# Patient Record
Sex: FEMALE | Race: BLACK OR AFRICAN AMERICAN | NOT HISPANIC OR LATINO | Employment: FULL TIME | URBAN - METROPOLITAN AREA
[De-identification: names, ages, dates, MRNs, and addresses within clinical notes are randomized per-mention and may not be internally consistent; named-entity substitution may affect disease eponyms.]

---

## 2022-01-18 ENCOUNTER — HOSPITAL ENCOUNTER (EMERGENCY)
Facility: CLINIC | Age: 42
Discharge: HOME | End: 2022-01-18
Attending: EMERGENCY MEDICINE | Admitting: EMERGENCY MEDICINE
Payer: COMMERCIAL

## 2022-01-18 ENCOUNTER — APPOINTMENT (OUTPATIENT)
Dept: ULTRASOUND IMAGING | Facility: CLINIC | Age: 42
End: 2022-01-18
Attending: EMERGENCY MEDICINE
Payer: COMMERCIAL

## 2022-01-18 VITALS
TEMPERATURE: 97.2 F | DIASTOLIC BLOOD PRESSURE: 76 MMHG | SYSTOLIC BLOOD PRESSURE: 143 MMHG | OXYGEN SATURATION: 100 % | HEART RATE: 86 BPM | RESPIRATION RATE: 20 BRPM

## 2022-01-18 DIAGNOSIS — O20.0 THREATENED MISCARRIAGE IN EARLY PREGNANCY: ICD-10-CM

## 2022-01-18 PROBLEM — F41.9 ANXIETY: Status: ACTIVE | Noted: 2018-11-29

## 2022-01-18 LAB
ABO/RH(D): NORMAL
ANION GAP SERPL CALCULATED.3IONS-SCNC: 3 MMOL/L (ref 3–14)
ANTIBODY SCREEN: NEGATIVE
B-HCG SERPL-ACNC: 834 IU/L (ref 0–5)
BASOPHILS # BLD AUTO: 0.1 10E3/UL (ref 0–0.2)
BASOPHILS NFR BLD AUTO: 1 %
BUN SERPL-MCNC: 13 MG/DL (ref 7–30)
CALCIUM SERPL-MCNC: 8.8 MG/DL (ref 8.5–10.1)
CHLORIDE BLD-SCNC: 105 MMOL/L (ref 94–109)
CO2 SERPL-SCNC: 27 MMOL/L (ref 20–32)
CREAT SERPL-MCNC: 0.68 MG/DL (ref 0.52–1.04)
EOSINOPHIL # BLD AUTO: 0.2 10E3/UL (ref 0–0.7)
EOSINOPHIL NFR BLD AUTO: 2 %
ERYTHROCYTE [DISTWIDTH] IN BLOOD BY AUTOMATED COUNT: 16.7 % (ref 10–15)
GFR SERPL CREATININE-BSD FRML MDRD: >90 ML/MIN/1.73M2
GLUCOSE BLD-MCNC: 90 MG/DL (ref 70–99)
HCT VFR BLD AUTO: 32.2 % (ref 35–47)
HGB BLD-MCNC: 9.8 G/DL (ref 11.7–15.7)
HOLD SPECIMEN: NORMAL
IMM GRANULOCYTES # BLD: 0 10E3/UL
IMM GRANULOCYTES NFR BLD: 0 %
LYMPHOCYTES # BLD AUTO: 3.7 10E3/UL (ref 0.8–5.3)
LYMPHOCYTES NFR BLD AUTO: 42 %
MCH RBC QN AUTO: 25.3 PG (ref 26.5–33)
MCHC RBC AUTO-ENTMCNC: 30.4 G/DL (ref 31.5–36.5)
MCV RBC AUTO: 83 FL (ref 78–100)
MONOCYTES # BLD AUTO: 0.6 10E3/UL (ref 0–1.3)
MONOCYTES NFR BLD AUTO: 7 %
NEUTROPHILS # BLD AUTO: 4.2 10E3/UL (ref 1.6–8.3)
NEUTROPHILS NFR BLD AUTO: 48 %
NRBC # BLD AUTO: 0 10E3/UL
NRBC BLD AUTO-RTO: 0 /100
PLATELET # BLD AUTO: 326 10E3/UL (ref 150–450)
POTASSIUM BLD-SCNC: 3.7 MMOL/L (ref 3.4–5.3)
RBC # BLD AUTO: 3.88 10E6/UL (ref 3.8–5.2)
SODIUM SERPL-SCNC: 135 MMOL/L (ref 133–144)
SPECIMEN EXPIRATION DATE: NORMAL
WBC # BLD AUTO: 8.8 10E3/UL (ref 4–11)

## 2022-01-18 PROCEDURE — 86901 BLOOD TYPING SEROLOGIC RH(D): CPT | Performed by: EMERGENCY MEDICINE

## 2022-01-18 PROCEDURE — 85004 AUTOMATED DIFF WBC COUNT: CPT | Performed by: EMERGENCY MEDICINE

## 2022-01-18 PROCEDURE — 36415 COLL VENOUS BLD VENIPUNCTURE: CPT | Performed by: EMERGENCY MEDICINE

## 2022-01-18 PROCEDURE — 76801 OB US < 14 WKS SINGLE FETUS: CPT

## 2022-01-18 PROCEDURE — 84702 CHORIONIC GONADOTROPIN TEST: CPT | Performed by: EMERGENCY MEDICINE

## 2022-01-18 PROCEDURE — 99284 EMERGENCY DEPT VISIT MOD MDM: CPT | Mod: 25

## 2022-01-18 PROCEDURE — 80048 BASIC METABOLIC PNL TOTAL CA: CPT | Performed by: EMERGENCY MEDICINE

## 2022-01-18 RX ORDER — PRENATAL VIT/IRON FUM/FOLIC AC 27MG-0.8MG
1 TABLET ORAL DAILY
Qty: 90 TABLET | Refills: 0 | Status: SHIPPED | OUTPATIENT
Start: 2022-01-18

## 2022-01-18 ASSESSMENT — ENCOUNTER SYMPTOMS
LIGHT-HEADEDNESS: 1
FEVER: 0
DYSURIA: 0
CONSTIPATION: 0
NAUSEA: 1
VOMITING: 0
ARTHRALGIAS: 1
DIARRHEA: 0
HEMATURIA: 0
CHILLS: 1
SHORTNESS OF BREATH: 0
MYALGIAS: 1

## 2022-01-18 NOTE — ED TRIAGE NOTES
Presents to ED with vaginal bleeding. Last menstrual period November 3rd. Pt states she started spotting in December, then it went away. A few days ago, she had dark red blood with clots and states sometimes there is clots. Pt states she feels lightheaded. Unable to get in to see OB for awhile. ABCs intact. A&OX4.

## 2022-01-18 NOTE — ED PROVIDER NOTES
History   Chief Complaint:  Vaginal Bleeding     The history is provided by the patient.      Marium Rivero is a 41 year old female  who presents with vaginal bleeding. Patient developed vaginal bleeding eight days ago. The bleeding was initially heavy and had clots and she reports passing what looked like some tissue. It got lighter for a few days but today she noticed increased bleeding again prompting her visit. She did have some mild pelvic pain. She is nauseous and having body aches. She feels lightheaded and chilled. She is feeling more emotional. Her last menstrual period was the . She had a positive pregnancy test at home. She thinks she had a miscarriage. She had some leg swelling but that has resolved. No shortness of breath. No urinary or bowel problems. No vomiting. No fever. No previous miscarriages. She denies any other symptoms.    Review of Systems   Constitutional: Positive for chills. Negative for fever.   Respiratory: Negative for shortness of breath.    Cardiovascular: Negative for leg swelling.   Gastrointestinal: Positive for nausea. Negative for constipation, diarrhea and vomiting.   Genitourinary: Positive for pelvic pain, vaginal bleeding and vaginal pain. Negative for dysuria and hematuria.   Musculoskeletal: Positive for arthralgias and myalgias.   Neurological: Positive for light-headedness.   All other systems reviewed and are negative.      Allergies:  The patient has no known allergies.     Medications:  Flexeril   Immune globulin injection   Mefloquine   Buspar     Past Medical History:     Pityriasis alba   Female genital infibulation     Anxiety   Preeclampsia   Anemia, post partum   Major depression   Grand multipara   Gastroenteritis     Social History:  Presents to ED alone    Physical Exam     Patient Vitals for the past 24 hrs:   BP Temp Temp src Pulse Resp SpO2   22 1902 (!) 143/76 -- -- 86 20 100 %   22 1442 (!) 148/79 97.2  F (36.2  C)  Temporal 84 18 100 %       Physical Exam  General:          Well appearing, nontoxic. Resting comfortably  Head:              Scalp, face, and head appear normal  Eyes:               Pupils are equal, round                          Conjunctivae non-injected and sclerae white  ENT:                The external nose is normal                          Pinnae are normal  Neck:              Normal range of motion                          There is no rigidity noted                          Trachea is in the midline  CV:                  Regular rate and rhythm                           Normal S1/S2, no S3/S4                          No murmur or rub. Radial pulses 2+ bilaterally.  Resp:              Lungs are clear and equal bilaterally  There is no tachypnea                          No increased work of breathing                          No rales, wheezing, or rhonchi  GI:                   Abdomen is soft, no rigidity or guarding                          No distension, or mass                          No tenderness or rebound tenderness   MS:                  Normal muscular tone                          Symmetric motor strength                          No lower extremity edema  Skin:               No rash or acute skin lesions noted  Neuro:  Awake and alert  Speech is normal and fluent  Moves all extremities spontaneously  Psych:              Normal affect. Appropriate interactions.    Emergency Department Course     Imaging:  OB  US 1st trimester w transvag   Final Result   IMPRESSION: Small cystic structure at the endometrium could be a   gestational sac, but no yolk sac or fetal pole can be seen. Viability   is not established. If this is a gestational sac, the gestational age   is approximately 4 weeks 6 days. Recommend further assessment with   serial beta hCG, and imaging as needed. The right and left ovaries are   obscured for assessment.      LEIDY MELGAR MD            SYSTEM ID:  [id number]      Report per  radiology    Laboratory:  Labs Ordered and Resulted from Time of ED Arrival to Time of ED Departure   HCG QUANTITATIVE PREGNANCY - Abnormal       Result Value    hCG Quantitative 834 (*)    CBC WITH PLATELETS AND DIFFERENTIAL - Abnormal    WBC Count 8.8      RBC Count 3.88      Hemoglobin 9.8 (*)     Hematocrit 32.2 (*)     MCV 83      MCH 25.3 (*)     MCHC 30.4 (*)     RDW 16.7 (*)     Platelet Count 326      % Neutrophils 48      % Lymphocytes 42      % Monocytes 7      % Eosinophils 2      % Basophils 1      % Immature Granulocytes 0      NRBCs per 100 WBC 0      Absolute Neutrophils 4.2      Absolute Lymphocytes 3.7      Absolute Monocytes 0.6      Absolute Eosinophils 0.2      Absolute Basophils 0.1      Absolute Immature Granulocytes 0.0      Absolute NRBCs 0.0     BASIC METABOLIC PANEL - Normal    Sodium 135      Potassium 3.7      Chloride 105      Carbon Dioxide (CO2) 27      Anion Gap 3      Urea Nitrogen 13      Creatinine 0.68      Calcium 8.8      Glucose 90      GFR Estimate >90     TYPE AND SCREEN, ADULT    ABO/RH(D) B POS      Antibody Screen Negative      SPECIMEN EXPIRATION DATE      ABO/RH TYPE AND SCREEN      Emergency Department Course:    Reviewed:  I reviewed nursing notes, vitals, past medical history and Care Everywhere    Assessments/Consults:  ED Course as of 22e  Obtained history and examined the patient as noted above.      Rechecked the patient and explained findings.       Disposition:  The patient was discharged to home.     Impression & Plan       Medical Decision Making:  Marium Rivero is a 41 year old female  who presents for vaginal bleeding in the setting of positive home pregnancy test.  On my evaluation she is well-appearing, hemodynamically stable and afebrile.  Patient reports large volume of bleeding with passing clots and tissue.  She reports that she thinks that she has had a miscarriage.  Abdominal exam is benign  without evidence of peritonitis or acute surgical emergency.  No significant abdominal pain or tenderness on examination to suggest ectopic pregnancy.  She reports her last menstrual period was at the beginning of November which would place her at approximately 10 weeks gestational age currently.  Pelvic ultrasound was obtained which reveals a small cystic structure at the endometrium which may represent a small gestational sac but no definitive IUP is visualized.  Measurements of this sac/cyst would correspond to a gestational age of 4 weeks and 6 days if it did represent a true gestational sac. Quant hCG is 834.  I discussed with the patient that findings may represent earlier pregnancy than expected given her last menstrual period or miscarriage in progress.  Patient will need follow-up with OB/GYN and repeat hCG level in 2 to 3 days.  I recommended expectant management.  She should start prenatal vitamin with iron given blood loss anemia.  No severe hemorrhage which would require emergent OB/GYN consultation from the emergency department or admission to the hospital.  ED work-up is otherwise reassuring.  Patient was agreeable to plan of care.  Close return precautions were provided and she was discharged in stable condition.    Diagnosis:    ICD-10-CM    1. Threatened miscarriage in early pregnancy  O20.0        Scribe Disclosure:  Nghia HERRERA, am serving as a scribe at 4:50 PM on 1/18/2022 to document services personally performed by Bruno Russell MD based on my observations and the provider's statements to me.            Bruno Russell MD  01/18/22 2045

## 2022-01-19 NOTE — DISCHARGE INSTRUCTIONS
YOUR PREGNANCY HORMONE LEVEL HCG NEEDS TO BE RECHECKED IN 2-3 DAYS TO DETERMINE IF THE PREGNANCY HAS FAILED AND YOU ARE HAVING A MISCARRIAGE.

## 2022-10-04 ENCOUNTER — HOSPITAL ENCOUNTER (EMERGENCY)
Facility: CLINIC | Age: 42
Discharge: HOME | End: 2022-10-04
Attending: EMERGENCY MEDICINE | Admitting: EMERGENCY MEDICINE
Payer: COMMERCIAL

## 2022-10-04 VITALS
TEMPERATURE: 97 F | OXYGEN SATURATION: 99 % | SYSTOLIC BLOOD PRESSURE: 112 MMHG | RESPIRATION RATE: 16 BRPM | HEART RATE: 82 BPM | DIASTOLIC BLOOD PRESSURE: 61 MMHG

## 2022-10-04 DIAGNOSIS — R10.9 FLANK PAIN: ICD-10-CM

## 2022-10-04 DIAGNOSIS — S39.012A STRAIN OF LUMBAR REGION, INITIAL ENCOUNTER: ICD-10-CM

## 2022-10-04 LAB
ALBUMIN SERPL BCG-MCNC: 3.3 G/DL (ref 3.5–5.2)
ALBUMIN UR-MCNC: NEGATIVE MG/DL
ALP SERPL-CCNC: 81 U/L (ref 35–104)
ALT SERPL W P-5'-P-CCNC: 7 U/L (ref 10–35)
ANION GAP SERPL CALCULATED.3IONS-SCNC: 13 MMOL/L (ref 7–15)
APPEARANCE UR: CLEAR
AST SERPL W P-5'-P-CCNC: 16 U/L (ref 10–35)
BACTERIA #/AREA URNS HPF: ABNORMAL /HPF
BASOPHILS # BLD AUTO: 0 10E3/UL (ref 0–0.2)
BASOPHILS NFR BLD AUTO: 0 %
BILIRUB SERPL-MCNC: <0.2 MG/DL
BILIRUB UR QL STRIP: NEGATIVE
BUN SERPL-MCNC: 4.5 MG/DL (ref 6–20)
CALCIUM SERPL-MCNC: 8.9 MG/DL (ref 8.6–10)
CHLORIDE SERPL-SCNC: 102 MMOL/L (ref 98–107)
COLOR UR AUTO: ABNORMAL
CREAT SERPL-MCNC: 0.48 MG/DL (ref 0.51–0.95)
DEPRECATED HCO3 PLAS-SCNC: 20 MMOL/L (ref 22–29)
EOSINOPHIL # BLD AUTO: 0.1 10E3/UL (ref 0–0.7)
EOSINOPHIL NFR BLD AUTO: 1 %
ERYTHROCYTE [DISTWIDTH] IN BLOOD BY AUTOMATED COUNT: 14.1 % (ref 10–15)
GFR SERPL CREATININE-BSD FRML MDRD: >90 ML/MIN/1.73M2
GLUCOSE SERPL-MCNC: 118 MG/DL (ref 70–99)
GLUCOSE UR STRIP-MCNC: NEGATIVE MG/DL
HCT VFR BLD AUTO: 34 % (ref 35–47)
HGB BLD-MCNC: 10.8 G/DL (ref 11.7–15.7)
HGB UR QL STRIP: NEGATIVE
HOLD SPECIMEN: NORMAL
IMM GRANULOCYTES # BLD: 0.1 10E3/UL
IMM GRANULOCYTES NFR BLD: 1 %
KETONES UR STRIP-MCNC: NEGATIVE MG/DL
LEUKOCYTE ESTERASE UR QL STRIP: NEGATIVE
LIPASE SERPL-CCNC: 24 U/L (ref 13–60)
LYMPHOCYTES # BLD AUTO: 1.9 10E3/UL (ref 0.8–5.3)
LYMPHOCYTES NFR BLD AUTO: 22 %
MCH RBC QN AUTO: 27.8 PG (ref 26.5–33)
MCHC RBC AUTO-ENTMCNC: 31.8 G/DL (ref 31.5–36.5)
MCV RBC AUTO: 87 FL (ref 78–100)
MONOCYTES # BLD AUTO: 0.4 10E3/UL (ref 0–1.3)
MONOCYTES NFR BLD AUTO: 4 %
MUCOUS THREADS #/AREA URNS LPF: PRESENT /LPF
NEUTROPHILS # BLD AUTO: 6.4 10E3/UL (ref 1.6–8.3)
NEUTROPHILS NFR BLD AUTO: 72 %
NITRATE UR QL: NEGATIVE
NRBC # BLD AUTO: 0 10E3/UL
NRBC BLD AUTO-RTO: 0 /100
PH UR STRIP: 6.5 [PH] (ref 5–7)
PLATELET # BLD AUTO: 187 10E3/UL (ref 150–450)
POTASSIUM SERPL-SCNC: 3.5 MMOL/L (ref 3.4–5.3)
PROT SERPL-MCNC: 6.8 G/DL (ref 6.4–8.3)
RBC # BLD AUTO: 3.89 10E6/UL (ref 3.8–5.2)
RBC URINE: <1 /HPF
SODIUM SERPL-SCNC: 135 MMOL/L (ref 136–145)
SP GR UR STRIP: 1.01 (ref 1–1.03)
SQUAMOUS EPITHELIAL: 6 /HPF
UROBILINOGEN UR STRIP-MCNC: NORMAL MG/DL
WBC # BLD AUTO: 8.9 10E3/UL (ref 4–11)
WBC URINE: 3 /HPF

## 2022-10-04 PROCEDURE — 81001 URINALYSIS AUTO W/SCOPE: CPT | Performed by: EMERGENCY MEDICINE

## 2022-10-04 PROCEDURE — 250N000013 HC RX MED GY IP 250 OP 250 PS 637: Performed by: EMERGENCY MEDICINE

## 2022-10-04 PROCEDURE — 36415 COLL VENOUS BLD VENIPUNCTURE: CPT | Performed by: EMERGENCY MEDICINE

## 2022-10-04 PROCEDURE — 85025 COMPLETE CBC W/AUTO DIFF WBC: CPT | Performed by: EMERGENCY MEDICINE

## 2022-10-04 PROCEDURE — 83690 ASSAY OF LIPASE: CPT | Performed by: EMERGENCY MEDICINE

## 2022-10-04 PROCEDURE — 99283 EMERGENCY DEPT VISIT LOW MDM: CPT

## 2022-10-04 PROCEDURE — 80053 COMPREHEN METABOLIC PANEL: CPT | Performed by: EMERGENCY MEDICINE

## 2022-10-04 RX ORDER — ACETAMINOPHEN 325 MG/1
975 TABLET ORAL ONCE
Status: COMPLETED | OUTPATIENT
Start: 2022-10-04 | End: 2022-10-04

## 2022-10-04 RX ADMIN — ACETAMINOPHEN 975 MG: 325 TABLET, FILM COATED ORAL at 10:22

## 2022-10-04 ASSESSMENT — ENCOUNTER SYMPTOMS
DIARRHEA: 0
MYALGIAS: 1
NAUSEA: 0
DYSURIA: 0
CHILLS: 0
VOMITING: 0
SHORTNESS OF BREATH: 0
COUGH: 0
WEAKNESS: 0
HEMATURIA: 0
ABDOMINAL PAIN: 0
NUMBNESS: 0
DIFFICULTY URINATING: 0
FEVER: 0
BACK PAIN: 1
CONSTIPATION: 0
RHINORRHEA: 0
FREQUENCY: 0

## 2022-10-04 ASSESSMENT — ACTIVITIES OF DAILY LIVING (ADL): ADLS_ACUITY_SCORE: 35

## 2022-10-04 NOTE — ED PROVIDER NOTES
History   Chief Complaint:  Back Pain       HPI   Marium Rivero is a 42 year old female  currently 26 weeks pregnant who presents with back pain that began last night. Patient says the most severe pain is in her lower back, worsened on the right side. The pain radiates up into her shoulders and down both of her legs as well, making it difficult for her to ambulate and move. She has felt this pain before, but says it became much more severe last night after she got up from a low couch too quickly around 1900. She thinks she may have pulled a muscle in her back at this time. Patient took 500 mg of Tylenol last night, which offered some relief. She denies abdominal pain, vaginal bleeding, vaginal discharge, loss of fluid, and any contractions. Patient says she has a history of preeclampsia. She follows with [facility] for this pregnancy.  No LOF or CTX.    Review of Systems   Constitutional: Negative for chills and fever.   HENT: Negative for congestion and rhinorrhea.    Eyes: Negative for visual disturbance.   Respiratory: Negative for cough and shortness of breath.    Cardiovascular: Negative for chest pain.   Gastrointestinal: Negative for abdominal pain, constipation, diarrhea, nausea and vomiting.   Genitourinary: Negative for decreased urine volume, difficulty urinating, dysuria, frequency, hematuria, urgency, vaginal bleeding and vaginal discharge.   Musculoskeletal: Positive for back pain and myalgias.   Neurological: Negative for weakness and numbness.   All other systems reviewed and are negative.    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 No distress.  Head: Atraumatic.  Eyes: Conjunctivae and EOM are normal. No scleral icterus.  Neck: Normal range of motion. Neck supple.   Cardiovascular: Normal rate, regular rhythm, normal heart sounds and intact distal perfusion.   Pulmonary/Chest: Breath sounds normal. No respiratory distress.  Abdominal: Soft. Bowel sounds are normal. No distension. No tenderness. No rebound or guarding.   Musculoskeletal: Normal range of motion. No edema or tenderness. No midline cervical tenderness. Patient localizes the pain to the high right paralumbar muscles. Strength and sensation 5/5 in extremities   Neurological: Alert and orientated to person, place, and time. No observable focal neuro deficit  Skin: Warm and dry. No rash noted. Not diaphoretic.     Emergency Department Course   Laboratory:  Labs Ordered and Resulted from Time of ED Arrival to Time of ED Departure   COMPREHENSIVE METABOLIC PANEL - Abnormal       Result Value    Sodium 135 (*)     Potassium 3.5      Chloride 102      Carbon Dioxide (CO2) 20 (*)     Anion Gap 13      Urea Nitrogen 4.5 (*)     Creatinine 0.48 (*)     Calcium 8.9      Glucose 118 (*)     Alkaline Phosphatase 81      AST 16      ALT 7 (*)     Protein Total 6.8      Albumin 3.3 (*)     Bilirubin Total <0.2      GFR Estimate >90     ROUTINE UA WITH MICROSCOPIC REFLEX TO CULTURE - Abnormal    Color Urine Light Yellow      Appearance Urine Clear      Glucose Urine Negative      Bilirubin Urine Negative      Ketones Urine Negative      Specific Gravity Urine 1.009      Blood Urine Negative      pH Urine 6.5      Protein Albumin Urine Negative      Urobilinogen Urine Normal      Nitrite Urine Negative      Leukocyte Esterase Urine Negative      Bacteria Urine Few (*)     Mucus Urine  Present (*)     RBC Urine <1      WBC Urine 3      Squamous Epithelials Urine 6 (*)    CBC WITH PLATELETS AND DIFFERENTIAL - Abnormal    WBC Count 8.9      RBC Count 3.89      Hemoglobin 10.8 (*)     Hematocrit 34.0 (*)     MCV 87      MCH 27.8      MCHC 31.8      RDW 14.1      Platelet Count 187      % Neutrophils 72      % Lymphocytes 22      % Monocytes 4      % Eosinophils 1      % Basophils 0      % Immature Granulocytes 1      NRBCs per 100 WBC 0      Absolute Neutrophils 6.4      Absolute Lymphocytes 1.9      Absolute Monocytes 0.4      Absolute Eosinophils 0.1      Absolute Basophils 0.0      Absolute Immature Granulocytes 0.1      Absolute NRBCs 0.0     LIPASE - Normal    Lipase 24        Emergency Department Course:   Reviewed:  I reviewed nursing notes, vitals, past medical history and Care Everywhere    Assessments:  1006 I obtained history and examined the patient as noted above.    Interventions:  1022 Tylenol  975 mg  PO    Disposition:  The patient was discharged to home.     Impression & Plan     CMS Diagnoses: None    Medical Decision Making:  Marium Rivero is a 42 year old female who presents for evaluation of back pain that started after quickly jumping up from couch and feeling a muscle or back strain.   Pain has improved with interventions in the emergency department. The patient did not sustain any trauma and pregnant, therefore x-rays are not necessary due to the low likelihood of fracture or subluxation.  No red flag symptoms to suggest CT and/or MRI is indicated at this point.  There is no clinical evidence of cauda equina syndrome, discitis, spinal/epidural space hematoma or epidural abscess or other worrisome etiology. The neurological exam is normal and the patient's symptoms seem consistent with musculoskeletal issues and significant muscle spasm.  Broad DDX and referred pain and mimics also considered and workup above reflects this.    The patient will be discharged with pain  medications to use as directed. Ice or heat to the back and stretching exercises. No heavy lifting, bending or twisting. Return if increasing pain, numbness, weakness, or bowel or bladder dysfunction. She was advised to schedule follow-up with her primary doctor within 3 days to re-assess symptoms.  All questions and concerns were answered. The patient was discharged home and recommended to follow up with her primary physician and return with any new or worsening symptoms.     Diagnosis:    ICD-10-CM    1. Strain of lumbar region, initial encounter  S39.012A    2. Flank pain  R10.9        Scribe Disclosure:  I, Rita De Souza, am serving as a scribe at 10:05 AM on 10/4/2022 to document services personally performed by Anderson Pappas MD based on my observations and the provider's statements to me.            Anderson Pappas MD  10/04/22 5613

## 2022-10-04 NOTE — ED TRIAGE NOTES
Pt states that she developed atraumatic back pain last night. Pain radiates up into her shoulders and down into both legs making it difficult to walk. Pain started after standing up rom a low couch. Pt is 26 wks pregnant. This is her 9th pregnancy. Pt does not believe this pain is related to her pregnancy. Pt states that she believes her pain is in her muscles.

## 2022-11-08 ENCOUNTER — HOSPITAL ENCOUNTER (EMERGENCY)
Facility: CLINIC | Age: 42
Discharge: HOME | End: 2022-11-08
Attending: EMERGENCY MEDICINE | Admitting: EMERGENCY MEDICINE
Payer: COMMERCIAL

## 2022-11-08 ENCOUNTER — APPOINTMENT (OUTPATIENT)
Dept: GENERAL RADIOLOGY | Facility: CLINIC | Age: 42
End: 2022-11-08
Attending: EMERGENCY MEDICINE
Payer: COMMERCIAL

## 2022-11-08 VITALS
DIASTOLIC BLOOD PRESSURE: 89 MMHG | HEART RATE: 84 BPM | OXYGEN SATURATION: 100 % | SYSTOLIC BLOOD PRESSURE: 146 MMHG | TEMPERATURE: 96.9 F | RESPIRATION RATE: 18 BRPM

## 2022-11-08 DIAGNOSIS — K11.21 PAROTITIS, ACUTE: ICD-10-CM

## 2022-11-08 DIAGNOSIS — J02.0 STREPTOCOCCAL PHARYNGITIS: ICD-10-CM

## 2022-11-08 LAB
ANION GAP SERPL CALCULATED.3IONS-SCNC: 12 MMOL/L (ref 7–15)
BASOPHILS # BLD AUTO: 0 10E3/UL (ref 0–0.2)
BASOPHILS NFR BLD AUTO: 0 %
BUN SERPL-MCNC: 7.6 MG/DL (ref 6–20)
CALCIUM SERPL-MCNC: 8.8 MG/DL (ref 8.6–10)
CHLORIDE SERPL-SCNC: 102 MMOL/L (ref 98–107)
CREAT SERPL-MCNC: 0.47 MG/DL (ref 0.51–0.95)
DEPRECATED HCO3 PLAS-SCNC: 23 MMOL/L (ref 22–29)
DEPRECATED S PYO AG THROAT QL EIA: POSITIVE
EOSINOPHIL # BLD AUTO: 0 10E3/UL (ref 0–0.7)
EOSINOPHIL NFR BLD AUTO: 0 %
ERYTHROCYTE [DISTWIDTH] IN BLOOD BY AUTOMATED COUNT: 13.2 % (ref 10–15)
GFR SERPL CREATININE-BSD FRML MDRD: >90 ML/MIN/1.73M2
GLUCOSE SERPL-MCNC: 101 MG/DL (ref 70–99)
HCT VFR BLD AUTO: 36 % (ref 35–47)
HGB BLD-MCNC: 11.1 G/DL (ref 11.7–15.7)
IMM GRANULOCYTES # BLD: 0 10E3/UL
IMM GRANULOCYTES NFR BLD: 0 %
LYMPHOCYTES # BLD AUTO: 2.6 10E3/UL (ref 0.8–5.3)
LYMPHOCYTES NFR BLD AUTO: 29 %
MCH RBC QN AUTO: 27 PG (ref 26.5–33)
MCHC RBC AUTO-ENTMCNC: 30.8 G/DL (ref 31.5–36.5)
MCV RBC AUTO: 88 FL (ref 78–100)
MONOCYTES # BLD AUTO: 0.4 10E3/UL (ref 0–1.3)
MONOCYTES NFR BLD AUTO: 4 %
NEUTROPHILS # BLD AUTO: 5.9 10E3/UL (ref 1.6–8.3)
NEUTROPHILS NFR BLD AUTO: 67 %
NRBC # BLD AUTO: 0 10E3/UL
NRBC BLD AUTO-RTO: 0 /100
PLATELET # BLD AUTO: 207 10E3/UL (ref 150–450)
POTASSIUM SERPL-SCNC: 4.2 MMOL/L (ref 3.4–5.3)
RBC # BLD AUTO: 4.11 10E6/UL (ref 3.8–5.2)
SODIUM SERPL-SCNC: 137 MMOL/L (ref 136–145)
WBC # BLD AUTO: 8.9 10E3/UL (ref 4–11)

## 2022-11-08 PROCEDURE — 85025 COMPLETE CBC W/AUTO DIFF WBC: CPT | Performed by: EMERGENCY MEDICINE

## 2022-11-08 PROCEDURE — 82310 ASSAY OF CALCIUM: CPT | Performed by: EMERGENCY MEDICINE

## 2022-11-08 PROCEDURE — 36415 COLL VENOUS BLD VENIPUNCTURE: CPT | Performed by: EMERGENCY MEDICINE

## 2022-11-08 PROCEDURE — 87880 STREP A ASSAY W/OPTIC: CPT | Performed by: EMERGENCY MEDICINE

## 2022-11-08 PROCEDURE — 99284 EMERGENCY DEPT VISIT MOD MDM: CPT

## 2022-11-08 PROCEDURE — 70360 X-RAY EXAM OF NECK: CPT

## 2022-11-08 RX ORDER — PENICILLIN V POTASSIUM 500 MG/1
500 TABLET, FILM COATED ORAL 4 TIMES DAILY
Qty: 40 TABLET | Refills: 0 | Status: SHIPPED | OUTPATIENT
Start: 2022-11-08 | End: 2022-11-18

## 2022-11-08 ASSESSMENT — ACTIVITIES OF DAILY LIVING (ADL): ADLS_ACUITY_SCORE: 35

## 2022-11-08 ASSESSMENT — ENCOUNTER SYMPTOMS
FEVER: 1
COUGH: 0

## 2022-11-08 NOTE — ED TRIAGE NOTES
Pt c/o pain radiating from neck up to left ear x1 week. Endorses trouble swallowing but no throat pain. Pt just took 1g Tylenol PTA. No fevers. No facial droop. BEFAST negative. Pt is currently 30 weeks pregnant. ABCs intact.

## 2022-11-08 NOTE — DISCHARGE INSTRUCTIONS
Return to the ER immediately for increased swelling or difficulty breathing.  Return for difficulty swallowing.    Please call the ENT clinic to schedule a follow-up appointment by the end of the week.    Your evaluation is consistent with parotitis (inflammation of the salivary gland).  Drink plenty of fluids to stay hydrated.  As we discussed you should try sucking on sugar-free lemon drops or other sour candy.

## 2022-11-08 NOTE — ED PROVIDER NOTES
"  History   Chief Complaint:  Mouth Problem and Facial Pain     The history is provided by the patient.      Marium Rivero is a 42 year old female, currently 30 weeks pregnant, who presents with left-sided facial and jaw pain along with pain and swelling under her tongue, worsening since onset 1 week ago. The patient states that \"it feels like there's another tongue under there [her tongue]\" and endorses increased salivary production. She additionally endorses fever, and persisting symptoms without relief prompted concern and her presentation to ED at this time. She denies experiencing similar symptoms in the past. She is otherwise well and without cough or other symptoms. She is currently pregnant and denies vaginal pain, bleeding, or discharge. The patient is otherwise healthy and without chronic medical conditions. She has had no recent injury.    Review of Systems   Constitutional: Positive for fever.   HENT:        (+) oral swelling under tongue  (+) left jaw/neck pain   Respiratory: Negative for cough.    Genitourinary: Negative for vaginal bleeding, vaginal discharge and vaginal pain.   All other systems reviewed and are negative.    Allergies:  No known drug allergies    Medications:  Cyclobenzaprine  Mefloquine    Past Medical History:     Anemia  Anxiety  Vitamin D deficiency  Preeclampsia  Recurrent UTI  Grand multiparity with pregnancy  Normal vaginal delivery  Major depression  Female genital cutting type 1 status    Social History:  The patient presents to the ED alone.  The patient arrived in a private vehicle.  PCP: No Ref-Primary, Physician     Physical Exam     Patient Vitals for the past 24 hrs:   BP Temp Temp src Pulse Resp SpO2   11/08/22 0737 (!) 146/89 96.9  F (36.1  C) Temporal 84 18 100 %     Physical Exam  Constitutional:       General: She is not in acute distress.     Appearance: She is not diaphoretic.   HENT:      Head: Atraumatic.      Right Ear: Tympanic membrane, ear canal and " external ear normal.      Left Ear: Tympanic membrane, ear canal and external ear normal.      Mouth/Throat:      Mouth: Mucous membranes are moist.      Pharynx: Oropharyngeal exudate present.      Comments: Mild erythema the posterior oropharynx.  Uvula midline.  No evidence of peritonsillar or retropharyngeal abscess.  The oropharynx is widely patent.  No drooling or stridor.  No tenderness of the anterior neck.  No elevation of the floor the mouth.  No submandibular induration.  There is mild fullness and tenderness over the left parotid gland along the angle of the mandible.  The right tonsil has a small amount of adherent exudate.  Eyes:      General: No scleral icterus.     Pupils: Pupils are equal, round, and reactive to light.   Cardiovascular:      Heart sounds: Normal heart sounds.   Pulmonary:      Effort: No respiratory distress.      Breath sounds: Normal breath sounds.   Abdominal:      General: Bowel sounds are normal.      Palpations: Abdomen is soft.      Tenderness: There is no abdominal tenderness.   Musculoskeletal:         General: No tenderness.   Skin:     General: Skin is warm.      Findings: No rash.           Emergency Department Course     Imaging:  XR Neck Soft Tissue   Preliminary Result   IMPRESSION: Single lateral view of the neck. The epiglottic shadow   appears to be closely opposed to the base of tongue, limiting its   evaluation. No obvious epiglottic soft tissue thickening identified.   The radiographic appearance of the base of tongue and   prevertebral/retropharyngeal soft tissues appear normal. Visualized   upper tracheal air column appears unremarkable. Normal cervical   vertebral body heights. Straightening of the normal cervical lordosis   which may be positional. There may be mild disc space narrowing at   C7-T1. No advanced cervical facet arthropathy is seen.        Report per radiology    Laboratory:  Labs Ordered and Resulted from Time of ED Arrival to Time of ED  Departure   BASIC METABOLIC PANEL - Abnormal       Result Value    Sodium 137      Potassium 4.2      Chloride 102      Carbon Dioxide (CO2) 23      Anion Gap 12      Urea Nitrogen 7.6      Creatinine 0.47 (*)     Calcium 8.8      Glucose 101 (*)     GFR Estimate >90     CBC WITH PLATELETS AND DIFFERENTIAL - Abnormal    WBC Count 8.9      RBC Count 4.11      Hemoglobin 11.1 (*)     Hematocrit 36.0      MCV 88      MCH 27.0      MCHC 30.8 (*)     RDW 13.2      Platelet Count 207      % Neutrophils 67      % Lymphocytes 29      % Monocytes 4      % Eosinophils 0      % Basophils 0      % Immature Granulocytes 0      NRBCs per 100 WBC 0      Absolute Neutrophils 5.9      Absolute Lymphocytes 2.6      Absolute Monocytes 0.4      Absolute Eosinophils 0.0      Absolute Basophils 0.0      Absolute Immature Granulocytes 0.0      Absolute NRBCs 0.0     STREPTOCOCCUS A RAPID SCREEN W REFELX TO PCR - Abnormal    Group A Strep antigen Positive (*)       Emergency Department Course:      Reviewed:  I reviewed nursing notes, vitals, past medical history and Care Everywhere.    Assessments:  1122 I obtained history and examined the patient as noted above.   1246 I rechecked the patient and explained findings. I believe that they are safe for discharge at this time.    Disposition:  The patient was discharged to home.     Impression & Plan     Medical Decision Making:  This patient is a 42-year-old woman who presents to the emergency department for evaluation of difficulty swallowing and hypersalivation.  This could be hypersalivation associated with pregnancy.  However, given the tenderness and apparent swelling over the left parotid gland this is most likely parotitis.  X-rays not consistent with epiglottitis or other acute pathology.  Her exam is not consistent with epiglottitis.  She is strep positive so will be covered with penicillin.  She is being referred to ENT.  We discussed return precautions.  She was advised to try a  sugar-free sour candies such as lemon drops.  On exam she does not have any salivary gland stone or other obvious obstruction.    Diagnosis:    ICD-10-CM    1. Parotitis, acute  K11.21       2. Streptococcal pharyngitis  J02.0         Discharge Medications:  New Prescriptions    PENICILLIN V (VEETID) 500 MG TABLET    Take 1 tablet (500 mg) by mouth 4 times daily for 10 days     Scribe Disclosure:  IYamileth, am serving as a scribe at 11:21 AM on 11/8/2022 to document services personally performed by Ata Larose MD based on my observations and the provider's statements to me.     Ata Larose MD  11/08/22 9928

## 2023-12-02 ENCOUNTER — APPOINTMENT (OUTPATIENT)
Dept: CT IMAGING | Facility: CLINIC | Age: 43
End: 2023-12-02
Attending: EMERGENCY MEDICINE
Payer: COMMERCIAL

## 2023-12-02 ENCOUNTER — HOSPITAL ENCOUNTER (EMERGENCY)
Facility: CLINIC | Age: 43
Discharge: HOME | End: 2023-12-02
Attending: EMERGENCY MEDICINE | Admitting: EMERGENCY MEDICINE
Payer: COMMERCIAL

## 2023-12-02 VITALS
TEMPERATURE: 97.5 F | DIASTOLIC BLOOD PRESSURE: 79 MMHG | SYSTOLIC BLOOD PRESSURE: 109 MMHG | OXYGEN SATURATION: 100 % | HEART RATE: 66 BPM | RESPIRATION RATE: 16 BRPM

## 2023-12-02 DIAGNOSIS — N30.01 ACUTE CYSTITIS WITH HEMATURIA: ICD-10-CM

## 2023-12-02 LAB
ALBUMIN UR-MCNC: 30 MG/DL
ANION GAP SERPL CALCULATED.3IONS-SCNC: 10 MMOL/L (ref 7–15)
APPEARANCE UR: ABNORMAL
BACTERIA #/AREA URNS HPF: ABNORMAL /HPF
BASOPHILS # BLD AUTO: 0 10E3/UL (ref 0–0.2)
BASOPHILS NFR BLD AUTO: 0 %
BILIRUB UR QL STRIP: NEGATIVE
BUN SERPL-MCNC: 9.5 MG/DL (ref 6–20)
CALCIUM SERPL-MCNC: 8.9 MG/DL (ref 8.6–10)
CHLORIDE SERPL-SCNC: 102 MMOL/L (ref 98–107)
COLOR UR AUTO: YELLOW
CREAT SERPL-MCNC: 0.61 MG/DL (ref 0.51–0.95)
DEPRECATED HCO3 PLAS-SCNC: 25 MMOL/L (ref 22–29)
EGFRCR SERPLBLD CKD-EPI 2021: >90 ML/MIN/1.73M2
EOSINOPHIL # BLD AUTO: 0.1 10E3/UL (ref 0–0.7)
EOSINOPHIL NFR BLD AUTO: 2 %
ERYTHROCYTE [DISTWIDTH] IN BLOOD BY AUTOMATED COUNT: 13.9 % (ref 10–15)
GLUCOSE SERPL-MCNC: 105 MG/DL (ref 70–99)
GLUCOSE UR STRIP-MCNC: NEGATIVE MG/DL
HCG UR QL: NEGATIVE
HCT VFR BLD AUTO: 37.6 % (ref 35–47)
HGB BLD-MCNC: 12 G/DL (ref 11.7–15.7)
HGB UR QL STRIP: ABNORMAL
IMM GRANULOCYTES # BLD: 0 10E3/UL
IMM GRANULOCYTES NFR BLD: 0 %
KETONES UR STRIP-MCNC: NEGATIVE MG/DL
LEUKOCYTE ESTERASE UR QL STRIP: ABNORMAL
LYMPHOCYTES # BLD AUTO: 3.1 10E3/UL (ref 0.8–5.3)
LYMPHOCYTES NFR BLD AUTO: 39 %
MCH RBC QN AUTO: 28.2 PG (ref 26.5–33)
MCHC RBC AUTO-ENTMCNC: 31.9 G/DL (ref 31.5–36.5)
MCV RBC AUTO: 88 FL (ref 78–100)
MONOCYTES # BLD AUTO: 0.5 10E3/UL (ref 0–1.3)
MONOCYTES NFR BLD AUTO: 6 %
MUCOUS THREADS #/AREA URNS LPF: PRESENT /LPF
NEUTROPHILS # BLD AUTO: 4.4 10E3/UL (ref 1.6–8.3)
NEUTROPHILS NFR BLD AUTO: 53 %
NITRATE UR QL: NEGATIVE
NRBC # BLD AUTO: 0 10E3/UL
NRBC BLD AUTO-RTO: 0 /100
PH UR STRIP: 5.5 [PH] (ref 5–7)
PLATELET # BLD AUTO: 278 10E3/UL (ref 150–450)
POTASSIUM SERPL-SCNC: 4.1 MMOL/L (ref 3.4–5.3)
RBC # BLD AUTO: 4.26 10E6/UL (ref 3.8–5.2)
RBC URINE: 177 /HPF
SODIUM SERPL-SCNC: 137 MMOL/L (ref 135–145)
SP GR UR STRIP: 1.02 (ref 1–1.03)
SQUAMOUS EPITHELIAL: 4 /HPF
UROBILINOGEN UR STRIP-MCNC: NORMAL MG/DL
WBC # BLD AUTO: 8.2 10E3/UL (ref 4–11)
WBC CLUMPS #/AREA URNS HPF: PRESENT /HPF
WBC URINE: >182 /HPF

## 2023-12-02 PROCEDURE — 258N000003 HC RX IP 258 OP 636: Performed by: EMERGENCY MEDICINE

## 2023-12-02 PROCEDURE — 36415 COLL VENOUS BLD VENIPUNCTURE: CPT | Performed by: EMERGENCY MEDICINE

## 2023-12-02 PROCEDURE — 96365 THER/PROPH/DIAG IV INF INIT: CPT

## 2023-12-02 PROCEDURE — 81025 URINE PREGNANCY TEST: CPT | Performed by: EMERGENCY MEDICINE

## 2023-12-02 PROCEDURE — 81001 URINALYSIS AUTO W/SCOPE: CPT | Performed by: EMERGENCY MEDICINE

## 2023-12-02 PROCEDURE — 96366 THER/PROPH/DIAG IV INF ADDON: CPT

## 2023-12-02 PROCEDURE — 99285 EMERGENCY DEPT VISIT HI MDM: CPT | Mod: 25

## 2023-12-02 PROCEDURE — 85014 HEMATOCRIT: CPT | Performed by: EMERGENCY MEDICINE

## 2023-12-02 PROCEDURE — 74176 CT ABD & PELVIS W/O CONTRAST: CPT

## 2023-12-02 PROCEDURE — 80048 BASIC METABOLIC PNL TOTAL CA: CPT | Performed by: EMERGENCY MEDICINE

## 2023-12-02 PROCEDURE — 87086 URINE CULTURE/COLONY COUNT: CPT | Performed by: EMERGENCY MEDICINE

## 2023-12-02 PROCEDURE — 250N000013 HC RX MED GY IP 250 OP 250 PS 637: Performed by: EMERGENCY MEDICINE

## 2023-12-02 PROCEDURE — 250N000011 HC RX IP 250 OP 636: Mod: JZ | Performed by: EMERGENCY MEDICINE

## 2023-12-02 RX ORDER — IBUPROFEN 600 MG/1
600 TABLET, FILM COATED ORAL ONCE
Status: COMPLETED | OUTPATIENT
Start: 2023-12-02 | End: 2023-12-02

## 2023-12-02 RX ORDER — PHENAZOPYRIDINE HYDROCHLORIDE 200 MG/1
200 TABLET, FILM COATED ORAL 3 TIMES DAILY
Qty: 9 TABLET | Refills: 0 | Status: SHIPPED | OUTPATIENT
Start: 2023-12-02 | End: 2023-12-05

## 2023-12-02 RX ORDER — CEFTRIAXONE 2 G/1
2 INJECTION, POWDER, FOR SOLUTION INTRAMUSCULAR; INTRAVENOUS ONCE
Status: COMPLETED | OUTPATIENT
Start: 2023-12-02 | End: 2023-12-02

## 2023-12-02 RX ORDER — CEPHALEXIN 500 MG/1
500 CAPSULE ORAL 4 TIMES DAILY
Qty: 40 CAPSULE | Refills: 0 | Status: SHIPPED | OUTPATIENT
Start: 2023-12-02 | End: 2023-12-12

## 2023-12-02 RX ADMIN — SODIUM CHLORIDE 1000 ML: 9 INJECTION, SOLUTION INTRAVENOUS at 11:13

## 2023-12-02 RX ADMIN — CEFTRIAXONE 2 G: 2 INJECTION, POWDER, FOR SOLUTION INTRAMUSCULAR; INTRAVENOUS at 11:18

## 2023-12-02 RX ADMIN — IBUPROFEN 600 MG: 600 TABLET, FILM COATED ORAL at 11:38

## 2023-12-02 ASSESSMENT — ACTIVITIES OF DAILY LIVING (ADL)
ADLS_ACUITY_SCORE: 35
ADLS_ACUITY_SCORE: 33

## 2023-12-02 NOTE — ED TRIAGE NOTES
Pt presents to the ED with complaint of urinary frequency and pain with urination. Pt states she also feels 3/10 pain in left flank. Symptoms started 1 week ago.      Triage Assessment (Adult)       Row Name 12/02/23 0947          Triage Assessment    Airway WDL WDL        Respiratory WDL    Respiratory WDL WDL        Skin Circulation/Temperature WDL    Skin Circulation/Temperature WDL WDL        Cardiac WDL    Cardiac WDL WDL        Peripheral/Neurovascular WDL    Peripheral Neurovascular WDL WDL        Cognitive/Neuro/Behavioral WDL    Cognitive/Neuro/Behavioral WDL WDL

## 2023-12-02 NOTE — DISCHARGE INSTRUCTIONS
Start antibiotics tomorrow    Return to the ED if you are unable to tolerate fluids, intractable nausea or vomiting, severe abdominal pain, fevers >101 or other acute changes.  Please follow up with your PCP in 2-3 days.        Discharge Instructions  Urinary Tract Infection  You or your child have been diagnosed with a urinary tract infection, or UTI. The urinary tract includes the kidneys (which make urine/pee), ureters (the tubes that carry urine/pee from the kidneys to the bladder), the bladder (which stores urine/pee), and urethra (the tube that carries urine/pee out of the bladder). Urinary tract infections occur when bacteria travel up the urethra into the bladder (bladder infection) and, in some cases, from there into the kidneys (kidney infection).  Generally, every Emergency Department visit should have a follow-up clinic visit with either a primary or a specialty clinic/provider. Please follow-up as instructed by your emergency provider today.  Return to the Emergency Department if:  You or your child have severe back pain.  You or your child are vomiting (throwing up) so that you cannot take your medicine.  You or your child have a new fever (had not previously had a fever) over 101 F.  You or your child have confusion or are very weak, or feel very ill.  Your child seems much more ill, will not wake up, will not respond right, or is crying for a long time and will not calm down.  You or your child are showing signs of dehydration. These signs may include decreased urination (pee), dry mouth/gums/tongue, or decreased activity.    Follow-up with your provider:   Children under 24 months need to be seen by their regular provider within one week after a diagnosis of a UTI. It may be necessary to do some more tests to look at the child s kidney or bladder.  You should begin to feel better within 24 - 48 hours of starting your antibiotic; follow-up with your regular clinic/doctor/provider if this is not the  case.    Treatment:   You will be treated with an antibiotic to kill the bacteria. We have to make an educated guess, based on what we know about common bacteria and antibiotics, as to which antibiotic will work for your infection. We will be correct most times but there will be some cases where the antibiotic chosen is not correct (see urine cultures below).  Take a pain medication such as acetaminophen (Tylenol ) or ibuprofen (Advil , Motrin , Nuprin ).  Phenazopyridine (Pyridium , Uristat ) is a prescription medication that numbs the bladder to reduce the burning pain of some UTIs.  The same medication is available in a non-prescription version (Azo-Standard , Urodol ). This medication will change the color of the urine and tears (usually blue or orange). If you wear contacts, do not wear them while taking this medication as they may be stained by the medication.    Urine Cultures:  If indicated, a urine culture may have been performed today. This test generally takes 24-48 hours to complete so the results are not known at this time. The results can confirm that an infection is present but also determine which antibiotic is effective for the specific bacteria that is causing the infection. If your urine culture shows that the antibiotic you were given today will not work to treat your infection, we will attempt to contact you to make arrangements to change the antibiotic. If the culture confirms that the antibiotic is effective for your infection, you will not be contacted. We often recommend follow-up with your regular physician/provider on the culture results regardless of this process.    Antibiotic Warning:   If you have been placed on antibiotics - watch for signs of allergic reaction.  These include rash, lip swelling, difficulty breathing, wheezing, and dizziness.  If you develop any of these symptoms, stop the antibiotic immediately and go to an emergency room or urgent care for evaluation.    Probiotics:  "If you have been given an antibiotic, you may want to also take a probiotic pill or eat yogurt with live cultures. Probiotics have \"good bacteria\" to help your intestines stay healthy. Studies have shown that probiotics help prevent diarrhea and other intestine problems (including C. diff infection) when you take antibiotics. You can buy these without a prescription in the pharmacy section of the store.   If you were given a prescription for medicine here today, be sure to read all of the information (including the package insert) that comes with your prescription.  This will include important information about the medicine, its side effects, and any warnings that you need to know about.  The pharmacist who fills the prescription can provide more information and answer questions you may have about the medicine.  If you have questions or concerns that the pharmacist cannot address, please call or return to the Emergency Department.   Remember that you can always come back to the Emergency Department if you are not able to see your regular provider in the amount of time listed above, if you get any new symptoms, or if there is anything that worries you.    "

## 2023-12-02 NOTE — ED PROVIDER NOTES
History     Chief Complaint:  Urinary Frequency       The history is provided by the patient.      Marium Rivero is a 43 year old female who presents for dysuria and frequency. Patient says that her symtoms of dysuria, urinary frequency, bilateral flank pain, chills, and nausea started about one week ago and she was seen on 11/30 at urgent care for these symtoms. She says that she has a history of recurrent UTIs and these symtoms feels the same. Patient denies fever, vomiting, abnormal vaginal discharge, cough, rhinorrhea, or sore throat. No bowel changes. She denies personal history of of kidney stones. Patient is not concerned for STI. She denies chance of pregnancy and notes that she is currently breastfeeding her ten month old son.       Independent Historian:    None- patient only     Review of External Notes:  Reviewed office visit note from 11/30/23 where patient was seen for dysuria.    Medications:    Pepcid   Senokot   Vistaril     Past Medical History:    Anemia   Anxiety   Major depression   Recurrent UTI     Physical Exam   Patient Vitals for the past 24 hrs:   BP Temp Temp src Pulse Resp SpO2   12/02/23 0946 117/64 97.5  F (36.4  C) Oral 64 18 100 %        Physical Exam  General: Resting on the bed.  Head: No obvious trauma to head.  Ears, Nose, Throat:  External ears normal.  Nose normal.    Eyes:  Conjunctivae clear.  Pupils are equal, round, and reactive.   Neck: Normal range of motion.  Neck supple.   CV: Regular rate and rhythm.  No murmurs.      Respiratory: Effort normal and breath sounds normal.  No wheezing or crackles.   Gastrointestinal: Soft.  No distension. There is LLQ tenderness.  There is no rigidity, no rebound and no guarding.   Musculoskeletal: No cva tenderness  Neuro: Alert. Moving all extremities appropriately.  Normal speech.    Skin: Skin is warm and dry.  No rash noted.     Emergency Department Course   Imaging:  CT Abdomen Pelvis w/o Contrast   Final Result   IMPRESSION:     1.  No urinary tract calculus nor hydronephrosis.   2.  Nondistended bladder with perivesicular edema/inflammation suggesting cystitis. Correlation with urinalysis.           Report per radiology    Laboratory:  Labs Ordered and Resulted from Time of ED Arrival to Time of ED Departure   ROUTINE UA WITH MICROSCOPIC REFLEX TO CULTURE - Abnormal       Result Value    Color Urine Yellow      Appearance Urine Cloudy (*)     Glucose Urine Negative      Bilirubin Urine Negative      Ketones Urine Negative      Specific Gravity Urine 1.023      Blood Urine Large (*)     pH Urine 5.5      Protein Albumin Urine 30 (*)     Urobilinogen Urine Normal      Nitrite Urine Negative      Leukocyte Esterase Urine Large (*)     Bacteria Urine Few (*)     WBC Clumps Urine Present (*)     Mucus Urine Present (*)     RBC Urine 177 (*)     WBC Urine >182 (*)     Squamous Epithelials Urine 4 (*)    BASIC METABOLIC PANEL - Abnormal    Sodium 137      Potassium 4.1      Chloride 102      Carbon Dioxide (CO2) 25      Anion Gap 10      Urea Nitrogen 9.5      Creatinine 0.61      GFR Estimate >90      Calcium 8.9      Glucose 105 (*)    HCG QUALITATIVE URINE - Normal    hCG Urine Qualitative Negative     CBC WITH PLATELETS AND DIFFERENTIAL    WBC Count 8.2      RBC Count 4.26      Hemoglobin 12.0      Hematocrit 37.6      MCV 88      MCH 28.2      MCHC 31.9      RDW 13.9      Platelet Count 278      % Neutrophils 53      % Lymphocytes 39      % Monocytes 6      % Eosinophils 2      % Basophils 0      % Immature Granulocytes 0      NRBCs per 100 WBC 0      Absolute Neutrophils 4.4      Absolute Lymphocytes 3.1      Absolute Monocytes 0.5      Absolute Eosinophils 0.1      Absolute Basophils 0.0      Absolute Immature Granulocytes 0.0      Absolute NRBCs 0.0     URINE CULTURE      Emergency Department Course & Assessments:     Interventions:  Medications   sodium chloride 0.9% BOLUS 1,000 mL (0 mLs Intravenous Stopped 12/2/23 5950)   cefTRIAXone  (ROCEPHIN) 2 g vial to attach to  ml bag for ADULTS or NS 50 ml bag for PEDS (0 g Intravenous Stopped 12/2/23 1336)   ibuprofen (ADVIL/MOTRIN) tablet 600 mg (600 mg Oral $Given 12/2/23 1138)        Independent Interpretation (X-rays, CTs, rhythm strip):  none    Assessments and Consultations/Discussion of Management or Tests:  ED Course as of 12/02/23 1108   Sat Dec 02, 2023   1054 I obtained history and examined the patient as noted above.    1101 I spoke with pharm SONY Armstrong about antibiotics for UTI in the setting of breastfeeding.  Ceftriaxone should be okay with infant monitoring as very little is excreted in breastmilk, monitor for GI symptoms.  Peak levels occur at 5 hours after dose.  Outpatient keflex would be appropriate.     1106 I discussed antibiotics and breastfeeding with patient. She is agreeable. Patient will be discharged.        Social Determinants of Health affecting care:  None      Disposition:  The patient was discharged to home.     Impression & Plan    Medical Decision Making:  3-year-old female presents to the ER with dysuria, frequency.  Vitals are reassuring.  Broad differentials pursued include not limited to UTI, pyelonephritis, nephrolithiasis, infected stone, colitis, diverticulitis, obstruction, pregnancy, etc.  CBC without leukocytosis or anemia.  BMP without acute electrolyte, metabolic or renal dysfunction.  UA shows evidence of infection with large leuk esterase white blood cell clumps and bacteria.  Also large amount of red blood cells complicating the picture making it look concerning for possible nephrolithiasis.  Fortunately there is no evidence of ureterolithiasis or urinary tract calculi on CT scan.  There is some inflammation of the bladder suggestive of cystitis.  Patient was given a dose of ceftriaxone in the emergency department.  She is tolerating p.o.  I suspicion for pyelonephritis is low but I will give a prolonged course of Keflex to cover for any possible  early pyelonephritis.  Patient tolerating p.o. normally.  Return precautions provided.  Close follow-up advised.    Diagnosis:    ICD-10-CM    1. Acute cystitis with hematuria  N30.01            Discharge Medications:  New Prescriptions    CEPHALEXIN (KEFLEX) 500 MG CAPSULE    Take 1 capsule (500 mg) by mouth 4 times daily for 10 days    PHENAZOPYRIDINE (PYRIDIUM) 200 MG TABLET    Take 1 tablet (200 mg) by mouth 3 times daily for 3 days        Scribe Disclosure:  Nia HERRERA, am serving as a scribe at 10:48 AM on 12/2/2023 to document services personally performed by Jeanne Bell MD based on my observations and the provider's statements to me.    12/2/2023   Jeanne Bell MD Bennett, Jennifer L, MD  12/02/23 1640

## 2023-12-03 LAB — BACTERIA UR CULT: ABNORMAL
